# Patient Record
(demographics unavailable — no encounter records)

---

## 2025-01-16 NOTE — CONSULT LETTER
[Dear  ___] : Dear  [unfilled], [Courtesy Letter:] : I had the pleasure of seeing your patient, [unfilled], in my office today. [Please see my note below.] : Please see my note below. [Sincerely,] : Sincerely, [FreeTextEntry3] : Vernon Gomez MD

## 2025-01-16 NOTE — HISTORY OF PRESENT ILLNESS
[de-identified] : 73 yo female is referred by Dr. Mackey for consultation of adjuvant endocrine therapy for breast cancer. Emmanuelle had a screening mammo on 12/10/2020 which showed focal asymmetry in both breasts. On 12/21/2020, b/l dx mammo and US showed 1.4 cm irregular mass in the left breast 3:00 axis, 4 to 5 cm from the nipple. This corresponded to the focal asymmetry seen on mammogram. US guided biopsy was recommended. There was no left axillary adenopathy. No suspicious masses were seen in the right breast. \par  \par  On 12/31/2020, she had US guided biopsy of left breast 1.4 cm mass which revealed Invasive well differentiated classical type lobular carcinoma with microcalcifications and signet ring cell formation. ER was positive 100%, TX positive 85%, Ki 67 7-10%, Her-2 negative (FISH ratio 1.1). 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 Additional indeterminate 0.5 cm enhancing focus in the left breast lower inner quadrant, located approximately 2 cm inferior and medial to the index carcinoma. MRI guided biopsy is recommended. There is no axillary adenopathy. No MR evidence of malignancy of the right breast.\par  \par  On 2/4/21, MRI guided needle core biopsies of left lower inner quadrant enhancing focus showed single small duct papilloma fragment measuring 1.0 mm in this\par  biopsy material and benign atrophic fatty breast tissue with proliferative type fibrocystic changes.\par  \par  On 2/16/21, she underwent left breast lumpectomy. The radio frequency seed localized lumpectomy of left lateral mass revealed 1.9 cm invasive well differentiated classical type lobular carcinoma and wide spread classical type lobular carcinoma in-situ (LCIS). No lymphovascular or perineural invasion is seen. The margins were negative. SLNB was not done. The radio frequency seed localized lumpectomy of left lower inner quadrant medial mass showed Benign atrophic fatty breast tissue without histopathologic abnormality.\par  Healing prior biopsy site with organizing hematoma formation with no residual intraductal papillary lesion.\par  \par  She recovered well from surgery. She saw Dr. Plaza and discussed adjuvant breast radiotherapy. She opted to omit radiation. She is here today with her daughter to discuss adjuvant endocrine therapy. \par  \par   [de-identified] : 7/12/21: The patient is here today for f/u visit. She has well differentiated cILC of the left breast, ER/CO positive, Her-2 negative, s/p left breast lumpectomy with negative margins on 2/16/21. SLNB was not done. The pathologic stage is pT1cNx. She saw Dr. Plaza and decided to omit adjuvant breast radiation. She has been taking adjuvant endocrine therapy with Letrozole and tolerated well. She had bone density in 5/2021 which showed osteopenia. She has been taking calcium and vitamin D supplement. She is feeling well and does not have new complains.  1/24/22: The patient is here today for f/u visit. She has well differentiated cILC of the left breast, ER/CO positive, Her-2 negative, s/p left breast lumpectomy with negative margins on 2/16/21. SLNB was not done. The pathologic stage is pT1cNx. She saw Dr. Plaza and decided to omit adjuvant breast radiation. She has been taking adjuvant endocrine therapy with Letrozole and tolerated well. She had bone density in 5/2021 which showed osteopenia. She has been taking calcium and vitamin D supplement. Left breast dx mammo and US in 8/20201 did not show suspicious finding. She is feeling well and does not have new complains.  7/25/22: Emmanuelle  is here today for f/u visit. She has well differentiated cILC of the left breast, ER/CO positive, Her-2 negative, s/p left breast lumpectomy with negative margins on 2/16/21. SLNB was not done. The pathologic stage is pT1cNx. She saw Dr. Plaza and decided to omit adjuvant breast radiation. She has been taking adjuvant endocrine therapy with Letrozole and tolerated well. She had bone density in 5/2021 which showed osteopenia. She has been taking calcium and vitamin D supplement. b/l breast dx mammo on 2/21/22 did not show suspicious finding. She has chronic back pain and does not have new complains.  1/30/23: Emmanuelle  is here today for f/u visit. She has well differentiated cILC of the left breast, ER/CO positive, Her-2 negative, s/p left breast lumpectomy with negative margins on 2/16/21. SLNB was not done. The pathologic stage is pT1cNx. She saw Dr. Plaza and decided to omit adjuvant breast radiation. She has been taking adjuvant endocrine therapy with Letrozole and tolerated well. She had bone density in 5/2021 which showed osteopenia. She has been taking calcium and vitamin D supplement. b/l breast dx mammo on 2/21/22 and left breast dx mammo in 8/2022 did not show suspicious finding. She has chronic arthralgia.  7/31/23 Emmanuelle  is here today for f/u visit. She has well differentiated cILC of the left breast, ER/CO positive, Her-2 negative, s/p left breast lumpectomy with negative margins on 2/16/21. SLNB was not done. The pathologic stage is pT1cNx. She saw Dr. Plaza and decided to omit adjuvant breast radiation. She has been taking adjuvant endocrine therapy with Letrozole and tolerated well. She had repeat  bone density in 2/23 which showed osteopenia. She has been taking calcium and vitamin D supplement. left breast usg and b/l mammo in 2.23 did not show suspicious finding. Recs follow up u/l left mammogram recs in 6 months.She has chronic arthralgia.  1/29/24: Emmanuelle  is here today for f/u visit. She has well differentiated cILC of the left breast, ER/CO positive, Her-2 negative, s/p left breast lumpectomy with negative margins on 2/16/21. SLNB was not done. The pathologic stage is pT1cNx. She saw Dr. Plaza and decided to omit adjuvant breast radiation. She has been taking adjuvant endocrine therapy with Letrozole and tolerated well. She had repeat  bone density in 2/2023 which showed osteopenia. She has been taking calcium and vitamin D supplement. left breast usg and b/l mammo in 2.23 did not show suspicious finding. Recs follow up u/l left mammogram recs in 6 months. She has some chronic joint pain but no other new complains.  8.26.24: Emmanuelle  is here today for f/u visit. She has well differentiated cILC of the left breast, ER/CO positive, Her-2 negative, s/p left breast lumpectomy with negative margins on 2/16/21. SLNB was not done. The pathologic stage is pT1cNx. She saw Dr. Plaza and decided to omit adjuvant breast radiation. She has been taking adjuvant endocrine therapy with Letrozole and tolerating well. She had repeat bone density in 2/2023 which showed osteopenia. She has been taking calcium and vitamin D supplement. B/l mammo in 2.2024 did not show suspicious finding.  She has some chronic joint pain and hair loss but no other new complains  1/16/25: Emmanuelle is here today for f/u visit. She has well differentiated cILC of the left breast, ER/CO positive, Her-2 negative, s/p left breast lumpectomy with negative margins on 2/16/21. SLNB was not done. The pathologic stage is pT1cNx. She saw Dr. Plaza and decided to omit adjuvant breast radiation. She has been taking adjuvant endocrine therapy with Letrozole and tolerating well. B/l mammo in 2.2024 did not show suspicious finding.  She has some chronic joint pain and hair loss but no other new complains

## 2025-01-16 NOTE — ASSESSMENT
[FreeTextEntry1] : 74 yo female has well differentiated cILC of the left breast, ER/KS positive, Her-2 negative, s/p left breast lumpectomy with negative margins. SLNB was not done. The pathologic stage is pT1cNx.  Assessment and Plan: -- Continue Letrozole daily. Refilled today  -- Breast exam today is unrevealing.  b/l screening mammo in 2.24 did not show suspicious finding. Mammogram script given, due 2.2025 -- Osteopenia. Continue continue calcium and vitamin D supplement and regular exercise. We discussed bone health management. She enrolled in the survey study on bone health management in women with hormone receptor positive breast cancer on adjuvant AI and has osteopenia/osteoporosis. Last dexa scan 2.2023. Due for repeat in 2.2025, script given today -- Follow up with PCP for health maintenance.  -- RTO for followup in 6 months.

## 2025-01-16 NOTE — PHYSICAL EXAM
[Fully active, able to carry on all pre-disease performance without restriction] : Status 0 - Fully active, able to carry on all pre-disease performance without restriction [Normal] : affect appropriate [de-identified] : Status post left breast lumpectomy. Surgical incisions are healing well. There is no palpable abnormality.

## 2025-03-03 NOTE — PHYSICAL EXAM
[Normocephalic] : normocephalic [Atraumatic] : atraumatic [EOMI] : extra ocular movement intact [Examined in the supine and seated position] : examined in the supine and seated position [Symmetrical] : symmetrical [No dominant masses] : no dominant masses in right breast  [No dominant masses] : no dominant masses left breast [No Nipple Retraction] : no left nipple retraction [No Nipple Discharge] : no left nipple discharge [No Axillary Lymphadenopathy] : no left axillary lymphadenopathy [No Edema] : no edema [No Rashes] : no rashes [No Ulceration] : no ulceration [de-identified] : On physical exam, there are no discrete masses in either breast or axilla. There is no nipple discharge or inversion bilaterally. There are no skin changes bilaterally.

## 2025-03-03 NOTE — DATA REVIEWED
[FreeTextEntry1] : C: [id number]     EXAM:  MG MAMMO SCREEN W DANI BI#  02/24/2025 INTERPRETATION:  HISTORY: Bilateral MG MAMMO SCREEN W DANI BI# was performed. Patient is 76 years old and is seen for screening.  The patient has a history of left lumpectomy in February, 2021 - malignant, left excision in February, 2021 - high risk, left MRI guided biopsy in February, 2021 - high risk and left ultrasound guided biopsy in January, 2021 - LCIS. The patient has no family history of breast cancer.  RISK ASSESSMENT: Tyrer-Cuzick Lifetime Risk: 23.5  CLINICAL BREAST EXAM: The patient reports their last clinical breast exam was performed within the past year.  COMPARISON STUDIES: The present examination has been compared to prior imaging studies performed at [facility] on 02/20/2023, 08/21/2023 and 02/22/2024.  MAMMOGRAM FINDINGS: Mammography was performed including the following views: bilateral craniocaudal with tomosynthesis, bilateral mediolateral oblique with tomosynthesis.  The examination includes digital synthetic 2D and digital tomosynthesis 3D images. Additional imaging analysis was performed using CAD (computer-aided detection) software.  There are scattered areas of fibroglandular density.  There is an area of architectural distortion at the site of lumpectomy seen in the left breast.  No suspicious mass, grouping of calcifications, or other abnormality is identified.  IMPRESSION: There is no mammographic evidence of malignancy.  RECOMMENDATION: Unless otherwise indicated by clinical findings, annual screening mammography recommended.  ASSESSMENT: BI-RADS Category 2:  Benign

## 2025-03-03 NOTE — ASSESSMENT
[FreeTextEntry1] : Emmanuelle is a 76 F who presents with a screen detected L breast cancer, ILC, [id number], ER/IL pos, Her 2 neg, stage 1 breast cancer, s/p L WLE of two areas on 2/16/2021.   Her imaging is as follows: 2/24/2025 b/l mammo-->birads2 scattered areas of fibroglandular density. area of architectural distortion at the site of lumpectomy seen in the left breast.   2/16/2021 -- L WLE of two areas  1. L lateral breast WLE  -ILC, well differentiated  -T=1.9 cm  -no LVI  -additional margins: lateral and inferior margins with ALH, medial margin with cLCIS, superior and posterior margins were benign breast tissue  -[id number], ER/IL pos, her 2 neg   2. L LIQ mass  -no residual intraductal papilloma  -benign breast tissue   On physical exam, there are no discrete masses in either breast or axilla. There is no nipple discharge or inversion bilaterally. There are no skin changes bilaterally. Left breast surgical scar well healed   All of her questions were answered.  She knows to call with any further questions or concerns.  PLAN -b/l  Mammogram 2/25/26 -f/up after imaging  -continue follow up with med/ onc   Total time spent on encounter was greater than 20 minutes, which included face to face time with the patient, performing an exam, reviewing previous medical records including imaging/ pathology, documenting in patient record and coordinating care/imaging. Greater than 50% of the encounter was spent on counseling and coordination of her breast issue.

## 2025-03-03 NOTE — HISTORY OF PRESENT ILLNESS
[FreeTextEntry1] : Emmanuelle is a 72 F who presents with a screen detected L breast cancer, ILC, [id number], ER/NH pos, Her 2 neg, stage 1 breast cancer, s/p L WLE of two areas on 2021.   2021 -- L WLE of two areas  1. L lateral breast WLE  -ILC, well differentiated  -T=1.9 cm  -no LVI  -additional margins: lateral and inferior margins with ALH, medial margin with cLCIS, superior and posterior margins were benign breast tissue  -[id number], ER/NH pos, her 2 neg   2. L LIQ mass  -no residual intraductal papilloma  -benign breast tissue   Her work up was as follows:  12/10/2020 -- b/l screening mammogram  -scattered areas of fibroglandular densities -L: focal asymmetry in L, with associated with architectural distortion  -R: asymmetry  BIRADS 0   2020 -- b/l dx mammogram and US  -scattered areas of fibroglandular densities -L: lateral breast, focal asymmetry with architectural distortion c/w US findings  -R: asymmetry effaces on spot compression  -no suspicious mass, microcalcifications or architectural distortion in R  b/l US  -L: @3N4-5, irregular mass, measures 1 x 1.4 x 0.7 cm --> BIOPSY  -R: no suspicious solid or cystic masses  BIRADS 4   2020 -- L US CNBx @3N4-5  -ILC, well differentiated with microcalcifications  -cLCIS  -ER/NH pos, Her 2 neg (luis angel ) -Ki67: 7-10%  2021 -- breast MRI  -RIGHT: no suspicious enhancement  -LEFT: in lateral/central breast, 1.4 x 1.5 x 1.6 cm enhancing mass which is her biopsy proven cancer, additional 0.5 cm enhancing mass, located 0.8 cm medial to her cancer, likely a satellite lesion; 0.5 cm enhancing mass in the LIQ, anterior depth --> BIOPSY  BIRADS 4   2021 -- L MR guided biopsy  -small duct papilloma   She has no breast related complaints at this time.  She denies any breast pain, has not palpated any new palpable masses in either breast and denies any nipple discharge or retraction.  She did develop ecchymoses and bruising after her recent biopsy, however.   HISTORICAL RISK FACTORS:  -no prior breast biopsies or surgeries  -no family history of breast or ovarian cancer  -, age at first live birth was 21  -prior OCP use x 2 years, stopped 40 years prior -s/p unilateral oophorectomy in   INTERVAL HISTORY:  Emmanuelle is a 72 F who returns for her FIRST POST OP VISIT, s/p L WLE of two areas for ILC and intraductal papilloma on 2021.   Her final pathology revealed a 1.9 cm ILC, with negative surgical margins, no LVI, additional ALH and cLCIS, and her LIQ intraductal papilloma did not reveal any other areas of disease and no residual papilloma was noted on final pathology.  Her cancer was ER/NH pos, Her 2 neg.   She is healing well from her recent surgery.  She denies any pain, redness, fevers or chills.    10/28/2021 -- Emmanuelle is a 73 F who returns for her SIX MONTHS VISIT, s/p L WLE of two areas for ILC and intraductal papilloma on 2021.  [id number] She saw Dr. Plaza and decided to omit adjuvant breast radiation. She has been taking adjuvant endocrine therapy with Anastrozole and tolerated well  Her most recent imaging is as follows: 2021 - Left dx mammo and US -breasts are heterogeneously dense -architectural distortion corresponding to the site of lumpectomy in the left lateral breast. LEFT US: -@3:00 N5 at the lumpectomy site, there is a benign seroma measuring 0.7 x 0.6 x 0.4 cm. Deemed BIRADS2   INTERVAL HISTORY 3/21/22 Emmanuelle is a 73 F who returns for her SIX MONTHS VISIT. She has no breast related complaints at this time.  She denies any breast pain, has not palpated any new palpable masses in either breast and denies any nipple discharge or retraction.  Her imaging is as follows: 2022 b/l dx mammo -scattered areas of fibroglandular density. -status post a left lumpectomy with stable architectural distortion most consistent with postsurgical change. BI-RADS 2  INTERVAL HISTORY 22 Emmanuelle is a 74 F who returns for her SIX MONTHS VISIT. She has no breast related complaints at this time.  She denies any breast pain, has not palpated any new palpable masses in either breast and denies any nipple discharge or retraction.  Her imaging is as follows: 08/15/2022 left dxmammo  -scattered areas of fibroglandular density. -patient is status post a left lumpectomy with stable architectural distortion most consistent with postsurgical change BI-RADS 2   INTERVAL HISTORY 23 Emmanuelle is a 74 F who returns for her SIX MONTHS VISIT. She has no breast related complaints at this time.  She denies any breast pain, has not palpated any new palpable masses in either breast and denies any nipple discharge or retraction.  She has been taking adjuvant endocrine therapy with Letrozole and tolerated well Her imaging is as follows: 2023 b/l dx mammo and LEFT US -scattered areas of fibroglandular density. -Stable postsurgical changes noted in left breast LEFT US: -Postsurgical changes are noted at 3:00 of the left breast lumpectomy site.  BIRADS2  INTERVAL HISTORY 23 Emmanuelle is a 75 F who returns for her SIX MONTHS VISIT. She has no breast related complaints at this time.  She denies any breast pain, has not palpated any new palpable masses in either breast and denies any nipple discharge or retraction.  She has been taking adjuvant endocrine therapy with Letrozole and tolerated well  Her imaging is as follows: 2023 left dx mammo  -scattered areas of fibroglandular density. -Stable postsurgical changes noted in left breast BIRADS2   INTERVAL HISTORY 24 Emmanuelle is a 75 F who returns for her SIX MONTHS VISIT. She has no breast related complaints at this time.  She denies any breast pain, has not palpated any new palpable masses in either breast and denies any nipple discharge or retraction.  She has been taking adjuvant endocrine therapy with Letrozole and tolerated well  Her imaging is as follows: 2024 b/l mammo-->birads2 scattered areas of fibroglandular density. area of architectural distortion at the site of lumpectomy seen in the left breast   INTERVAL HISTORY 3/3/25 Emmanuelle is a 76 F who returns for her follow up visit  She has no breast related complaints at this time.  She denies any breast pain, has not palpated any new palpable masses in either breast and denies any nipple discharge or retraction.  She has been taking adjuvant endocrine therapy with Letrozole and tolerated well  Her imaging is as follows: 2025 b/l mammo-->birads2 scattered areas of fibroglandular density. area of architectural distortion at the site of lumpectomy seen in the left breast.

## 2025-03-03 NOTE — PHYSICAL EXAM
[Normocephalic] : normocephalic [Atraumatic] : atraumatic [EOMI] : extra ocular movement intact [Examined in the supine and seated position] : examined in the supine and seated position [Symmetrical] : symmetrical [No dominant masses] : no dominant masses in right breast  [No dominant masses] : no dominant masses left breast [No Nipple Retraction] : no left nipple retraction [No Nipple Discharge] : no left nipple discharge [No Axillary Lymphadenopathy] : no left axillary lymphadenopathy [No Edema] : no edema [No Rashes] : no rashes [No Ulceration] : no ulceration [de-identified] : On physical exam, there are no discrete masses in either breast or axilla. There is no nipple discharge or inversion bilaterally. There are no skin changes bilaterally.

## 2025-03-03 NOTE — ASSESSMENT
[FreeTextEntry1] : Emmanuelle is a 76 F who presents with a screen detected L breast cancer, ILC, [id number], ER/MO pos, Her 2 neg, stage 1 breast cancer, s/p L WLE of two areas on 2/16/2021.   Her imaging is as follows: 2/24/2025 b/l mammo-->birads2 scattered areas of fibroglandular density. area of architectural distortion at the site of lumpectomy seen in the left breast.   2/16/2021 -- L WLE of two areas  1. L lateral breast WLE  -ILC, well differentiated  -T=1.9 cm  -no LVI  -additional margins: lateral and inferior margins with ALH, medial margin with cLCIS, superior and posterior margins were benign breast tissue  -[id number], ER/MO pos, her 2 neg   2. L LIQ mass  -no residual intraductal papilloma  -benign breast tissue   On physical exam, there are no discrete masses in either breast or axilla. There is no nipple discharge or inversion bilaterally. There are no skin changes bilaterally. Left breast surgical scar well healed   All of her questions were answered.  She knows to call with any further questions or concerns.  PLAN -b/l  Mammogram 2/25/26 -f/up after imaging  -continue follow up with med/ onc   Total time spent on encounter was greater than 20 minutes, which included face to face time with the patient, performing an exam, reviewing previous medical records including imaging/ pathology, documenting in patient record and coordinating care/imaging. Greater than 50% of the encounter was spent on counseling and coordination of her breast issue.

## 2025-03-03 NOTE — HISTORY OF PRESENT ILLNESS
[FreeTextEntry1] : Emmanuelle is a 72 F who presents with a screen detected L breast cancer, ILC, [id number], ER/UT pos, Her 2 neg, stage 1 breast cancer, s/p L WLE of two areas on 2021.   2021 -- L WLE of two areas  1. L lateral breast WLE  -ILC, well differentiated  -T=1.9 cm  -no LVI  -additional margins: lateral and inferior margins with ALH, medial margin with cLCIS, superior and posterior margins were benign breast tissue  -[id number], ER/UT pos, her 2 neg   2. L LIQ mass  -no residual intraductal papilloma  -benign breast tissue   Her work up was as follows:  12/10/2020 -- b/l screening mammogram  -scattered areas of fibroglandular densities -L: focal asymmetry in L, with associated with architectural distortion  -R: asymmetry  BIRADS 0   2020 -- b/l dx mammogram and US  -scattered areas of fibroglandular densities -L: lateral breast, focal asymmetry with architectural distortion c/w US findings  -R: asymmetry effaces on spot compression  -no suspicious mass, microcalcifications or architectural distortion in R  b/l US  -L: @3N4-5, irregular mass, measures 1 x 1.4 x 0.7 cm --> BIOPSY  -R: no suspicious solid or cystic masses  BIRADS 4   2020 -- L US CNBx @3N4-5  -ILC, well differentiated with microcalcifications  -cLCIS  -ER/UT pos, Her 2 neg (luis angel ) -Ki67: 7-10%  2021 -- breast MRI  -RIGHT: no suspicious enhancement  -LEFT: in lateral/central breast, 1.4 x 1.5 x 1.6 cm enhancing mass which is her biopsy proven cancer, additional 0.5 cm enhancing mass, located 0.8 cm medial to her cancer, likely a satellite lesion; 0.5 cm enhancing mass in the LIQ, anterior depth --> BIOPSY  BIRADS 4   2021 -- L MR guided biopsy  -small duct papilloma   She has no breast related complaints at this time.  She denies any breast pain, has not palpated any new palpable masses in either breast and denies any nipple discharge or retraction.  She did develop ecchymoses and bruising after her recent biopsy, however.   HISTORICAL RISK FACTORS:  -no prior breast biopsies or surgeries  -no family history of breast or ovarian cancer  -, age at first live birth was 21  -prior OCP use x 2 years, stopped 40 years prior -s/p unilateral oophorectomy in   INTERVAL HISTORY:  Emmanuelle is a 72 F who returns for her FIRST POST OP VISIT, s/p L WLE of two areas for ILC and intraductal papilloma on 2021.   Her final pathology revealed a 1.9 cm ILC, with negative surgical margins, no LVI, additional ALH and cLCIS, and her LIQ intraductal papilloma did not reveal any other areas of disease and no residual papilloma was noted on final pathology.  Her cancer was ER/UT pos, Her 2 neg.   She is healing well from her recent surgery.  She denies any pain, redness, fevers or chills.    10/28/2021 -- Emmanuelle is a 73 F who returns for her SIX MONTHS VISIT, s/p L WLE of two areas for ILC and intraductal papilloma on 2021.  [id number] She saw Dr. Plaza and decided to omit adjuvant breast radiation. She has been taking adjuvant endocrine therapy with Anastrozole and tolerated well  Her most recent imaging is as follows: 2021 - Left dx mammo and US -breasts are heterogeneously dense -architectural distortion corresponding to the site of lumpectomy in the left lateral breast. LEFT US: -@3:00 N5 at the lumpectomy site, there is a benign seroma measuring 0.7 x 0.6 x 0.4 cm. Deemed BIRADS2   INTERVAL HISTORY 3/21/22 Emmanuelle is a 73 F who returns for her SIX MONTHS VISIT. She has no breast related complaints at this time.  She denies any breast pain, has not palpated any new palpable masses in either breast and denies any nipple discharge or retraction.  Her imaging is as follows: 2022 b/l dx mammo -scattered areas of fibroglandular density. -status post a left lumpectomy with stable architectural distortion most consistent with postsurgical change. BI-RADS 2  INTERVAL HISTORY 22 Emmanuelle is a 74 F who returns for her SIX MONTHS VISIT. She has no breast related complaints at this time.  She denies any breast pain, has not palpated any new palpable masses in either breast and denies any nipple discharge or retraction.  Her imaging is as follows: 08/15/2022 left dxmammo  -scattered areas of fibroglandular density. -patient is status post a left lumpectomy with stable architectural distortion most consistent with postsurgical change BI-RADS 2   INTERVAL HISTORY 23 Emmanuelle is a 74 F who returns for her SIX MONTHS VISIT. She has no breast related complaints at this time.  She denies any breast pain, has not palpated any new palpable masses in either breast and denies any nipple discharge or retraction.  She has been taking adjuvant endocrine therapy with Letrozole and tolerated well Her imaging is as follows: 2023 b/l dx mammo and LEFT US -scattered areas of fibroglandular density. -Stable postsurgical changes noted in left breast LEFT US: -Postsurgical changes are noted at 3:00 of the left breast lumpectomy site.  BIRADS2  INTERVAL HISTORY 23 Emmanuelle is a 75 F who returns for her SIX MONTHS VISIT. She has no breast related complaints at this time.  She denies any breast pain, has not palpated any new palpable masses in either breast and denies any nipple discharge or retraction.  She has been taking adjuvant endocrine therapy with Letrozole and tolerated well  Her imaging is as follows: 2023 left dx mammo  -scattered areas of fibroglandular density. -Stable postsurgical changes noted in left breast BIRADS2   INTERVAL HISTORY 24 Emmanuelle is a 75 F who returns for her SIX MONTHS VISIT. She has no breast related complaints at this time.  She denies any breast pain, has not palpated any new palpable masses in either breast and denies any nipple discharge or retraction.  She has been taking adjuvant endocrine therapy with Letrozole and tolerated well  Her imaging is as follows: 2024 b/l mammo-->birads2 scattered areas of fibroglandular density. area of architectural distortion at the site of lumpectomy seen in the left breast   INTERVAL HISTORY 3/3/25 Emmanuelle is a 76 F who returns for her follow up visit  She has no breast related complaints at this time.  She denies any breast pain, has not palpated any new palpable masses in either breast and denies any nipple discharge or retraction.  She has been taking adjuvant endocrine therapy with Letrozole and tolerated well  Her imaging is as follows: 2025 b/l mammo-->birads2 scattered areas of fibroglandular density. area of architectural distortion at the site of lumpectomy seen in the left breast.

## 2025-03-03 NOTE — DATA REVIEWED
[FreeTextEntry1] : C: [id number]     EXAM:  MG MAMMO SCREEN W DANI BI#  02/24/2025 INTERPRETATION:  HISTORY: Bilateral MG MAMMO SCREEN W DANI BI# was performed. Patient is 76 years old and is seen for screening.  The patient has a history of left lumpectomy in February, 2021 - malignant, left excision in February, 2021 - high risk, left MRI guided biopsy in February, 2021 - high risk and left ultrasound guided biopsy in January, 2021 - LCIS. The patient has no family history of breast cancer.  RISK ASSESSMENT: Tyrer-Cuzick Lifetime Risk: 23.5  CLINICAL BREAST EXAM: The patient reports their last clinical breast exam was performed within the past year.  COMPARISON STUDIES: The present examination has been compared to prior imaging studies performed at [facility] on 02/20/2023, 08/21/2023 and 02/22/2024.  MAMMOGRAM FINDINGS: Mammography was performed including the following views: bilateral craniocaudal with tomosynthesis, bilateral mediolateral oblique with tomosynthesis.  The examination includes digital synthetic 2D and digital tomosynthesis 3D images. Additional imaging analysis was performed using CAD (computer-aided detection) software.  There are scattered areas of fibroglandular density.  There is an area of architectural distortion at the site of lumpectomy seen in the left breast.  No suspicious mass, grouping of calcifications, or other abnormality is identified.  IMPRESSION: There is no mammographic evidence of malignancy.  RECOMMENDATION: Unless otherwise indicated by clinical findings, annual screening mammography recommended.  ASSESSMENT: BI-RADS Category 2:  Benign

## 2025-03-19 NOTE — PROCEDURE
[FreeTextEntry3] :  Date of Service: 03/19/2025   Account: [id number]  Patient: CLINT CRAWFORD   YOB: 1948  Age: 76 year  Surgeon:      Georges Waggoner DO  Pre-Operative Diagnosis:         Right Primary Osteoarthritis of Hip (M16.0)  Post-Operative Diagnosis:       Same  Procedure:             Right Hip arthrogram and steroid injection under fluoroscopic guidance.    This procedure was carried out using fluoroscopic guidance.  The risks and benefits of the procedure were discussed extensively with the patient.  The consent of the patient was obtained and the following procedure was performed. The patient was placed in the supine position on the fluoroscopic table and the area was prepped and draped in a sterile fashion.  A timeout was performed with all essential staff present and the site and side were verified.  The patient was placed in the supine position with right hip flexed and externally rotated 25 degrees.  The area of the right groin was prepped and draped in a sterile fashion.  The fluoroscopic image intensifier was then positioned so that the right hip appeared in view, and the midline intertrochanteric region was identified and marked.  A 25 gauge spinal needle was then inserted and directed into this right hip intra-capsular region.  After negative aspiration for heme and CSF, 3 cc of Omnipaque was injected and appeared to fill the joint margins.  Right hip arthrogram showed no intravascular flow, and good spread around the femoral head and to the acetabulum. An injectate of 4 cc 0.25% Marcaine plus 10 mg of Dexamethasone was then injected into the right hip space.   The needle was subsequently removed.  Vital signs remained normal.  Pulse oximeter was used throughout the procedure and the patient's pulse and oxygen saturation remained within normal limits.  The patient tolerated the procedure well.  There were no complications.  The patient was instructed to apply ice over the injection sites for twenty minutes every two hours for the next 24 to 48 hours.  Disposition:       1. The patient was advised to F/U in 1-2 weeks to assess the response to the injection.      2. The patient was also instructed to contact me immediately if there were any concerns related to the procedure performed.    Georges Waggoner DO

## 2025-04-21 NOTE — PHYSICAL EXAM
[de-identified] : Right Hip/Thigh: minimal tenderness. ROM minimally restricted.  impingement + r side

## 2025-04-21 NOTE — ASSESSMENT
[FreeTextEntry1] : 74-year-old female presenting with much improved right hip pain secondary to right hip injection.  I advised to continue with conservative care.  She will follow-up in 6 months for reassessment.\par  \par  Entered by Loida Ochoa, acting as scribe for Dr. Crum.\par   \par  The documentation recorded by the scribe, in my presence, accurately reflects the service I personally performed, and the decisions made by me with my edits as appropriate.\par   \par  Best Regards, \par  Manuel Crum MD \par  Board Certified, Anesthesiology \par  Board Certified, Pain Medicine\par  \par

## 2025-04-21 NOTE — DISCUSSION/SUMMARY
[de-identified] : A discussion regarding available pain management treatment options occurred with the patient. These included interventional, rehabilitative, pharmacological, and alternative modalities. We will proceed with the following:   Interventional/ Procedures: 1. None indicated at this time.   Imagin.  Ordered X-ray of the bilateral hips/ pelvis due to pain and decrease in range of motion in that area to delineate a pain generator   Rehabilitative/alternative modalities: 1. Initiate physician directed activity and lifestyle modifications. 2. Participation in active HEP was discussed and printed. 3. Patient was advised to stay away from any heavy lifting. If needed, they were advised to squat and not bend forward. 4. PT Rx was given to the pt today for her low back.    F/u in 5-6 weeks   MAKENNA Wilkins DO

## 2025-04-21 NOTE — HISTORY OF PRESENT ILLNESS
[FreeTextEntry1] : HISTORY OF PRESENT ILLNESS: Ms. Raygoza is a 74 year old female complaining of hip pain. The pain started after being seen orthopedics and diagnosed with arthritis. The patient has had this pain for 1 year. Patient describes the pain as moderate to severe. During the last month the pain has been intermittent with symptoms worsening evening. Pain described as cramping. Pain is increased with standing, walking, sitting. Pain is decreased with sitting. Pain is not changed with coughing/sneezing and bowel movements. Bowel or bladder habits have not changed.  ACTIVITIES: Patient could walk 10 blocks before the pain starts. Patient can sit many hours before pain starts. Patient can stand many hours before pain starts. The patient never lies down because of pain. Patient uses no assistive walking device at this time. Patient has difficulty exercising at this time.  PRIOR PAIN TREATMENTS: No prior pain treatments noted.  Prior Pain Medications: Tylenol.  TODAY 04-: This is a previous patient of Dr. Tena transferring her care to me today. Last seen on 03/03/2025 and since then there has been no new complaints or acute changes. She underwent a right hip injection on 03/19/2025 which gave her 30% relief, to date. She states that most of her pain happens with no typical patten, she also gets random sharp shooting pain down her RT LE, she states that usually happens when she walks for a long period of time and occasionally she gets low back pain across.

## 2025-07-23 NOTE — ASSESSMENT
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 SLNB was not done. -- Continue Letrozole daily. Refilled today  -- Breast exam today is unrevealing.  b/l screening mammo in 2/2025 did not show suspicious finding. She will have repeat b/l screening mammo in 2/2026. -- Osteopenia. Continue calcium and vitamin D supplement and regular exercise. She enrolled in the survey study on bone health management in women with hormone receptor positive breast cancer on adjuvant AI and has osteopenia/osteoporosis.   # Iron deficiency anemia -- S/P flaring diverticulitis. Weight loss. -- S/P blood transfusion and iron infusion in 6/2024.  -- Will check CBC and iron study.  -- Will decide if iron infusion is needed.   RTO for followup in 3 months.

## 2025-07-23 NOTE — PHYSICAL EXAM
[Fully active, able to carry on all pre-disease performance without restriction] : Status 0 - Fully active, able to carry on all pre-disease performance without restriction [Normal] : affect appropriate [de-identified] : Status post left breast lumpectomy. Surgical incisions are healing well. There is no palpable abnormality.

## 2025-07-23 NOTE — PHYSICAL EXAM
[Fully active, able to carry on all pre-disease performance without restriction] : Status 0 - Fully active, able to carry on all pre-disease performance without restriction [Normal] : affect appropriate [de-identified] : Status post left breast lumpectomy. Surgical incisions are healing well. There is no palpable abnormality.

## 2025-07-23 NOTE — HISTORY OF PRESENT ILLNESS
[de-identified] : 71 yo female is referred by Dr. Mackye for consultation of adjuvant endocrine therapy for breast cancer. Emmanuelle had a screening mammo on 12/10/2020 which showed focal asymmetry in both breasts. On 12/21/2020, b/l dx mammo and US showed 1.4 cm irregular mass in the left breast 3:00 axis, 4 to 5 cm from the nipple. This corresponded to the focal asymmetry seen on mammogram. US guided biopsy was recommended. There was no left axillary adenopathy. No suspicious masses were seen in the right breast. \par  \par  On 12/31/2020, she had US guided biopsy of left breast 1.4 cm mass which revealed Invasive well differentiated classical type lobular carcinoma with microcalcifications and signet ring cell formation. ER was positive 100%, SC positive 85%, Ki 67 7-10%, Her-2 negative (FISH ratio 1.1). 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 Additional indeterminate 0.5 cm enhancing focus in the left breast lower inner quadrant, located approximately 2 cm inferior and medial to the index carcinoma. MRI guided biopsy is recommended. There is no axillary adenopathy. No MR evidence of malignancy of the right breast.\par  \par  On 2/4/21, MRI guided needle core biopsies of left lower inner quadrant enhancing focus showed single small duct papilloma fragment measuring 1.0 mm in this\par  biopsy material and benign atrophic fatty breast tissue with proliferative type fibrocystic changes.\par  \par  On 2/16/21, she underwent left breast lumpectomy. The radio frequency seed localized lumpectomy of left lateral mass revealed 1.9 cm invasive well differentiated classical type lobular carcinoma and wide spread classical type lobular carcinoma in-situ (LCIS). No lymphovascular or perineural invasion is seen. The margins were negative. SLNB was not done. The radio frequency seed localized lumpectomy of left lower inner quadrant medial mass showed Benign atrophic fatty breast tissue without histopathologic abnormality.\par  Healing prior biopsy site with organizing hematoma formation with no residual intraductal papillary lesion.\par  \par  She recovered well from surgery. She saw Dr. Plaza and discussed adjuvant breast radiotherapy. She opted to omit radiation. She is here today with her daughter to discuss adjuvant endocrine therapy. \par  \par   [de-identified] : 7/12/21: The patient is here today for f/u visit. She has well differentiated cILC of the left breast, ER/FL positive, Her-2 negative, s/p left breast lumpectomy with negative margins on 2/16/21. SLNB was not done. The pathologic stage is pT1cNx. She saw Dr. Plaza and decided to omit adjuvant breast radiation. She has been taking adjuvant endocrine therapy with Letrozole and tolerated well. She had bone density in 5/2021 which showed osteopenia. She has been taking calcium and vitamin D supplement. She is feeling well and does not have new complains.  1/24/22: The patient is here today for f/u visit. She has well differentiated cILC of the left breast, ER/FL positive, Her-2 negative, s/p left breast lumpectomy with negative margins on 2/16/21. SLNB was not done. The pathologic stage is pT1cNx. She saw Dr. Plaza and decided to omit adjuvant breast radiation. She has been taking adjuvant endocrine therapy with Letrozole and tolerated well. She had bone density in 5/2021 which showed osteopenia. She has been taking calcium and vitamin D supplement. Left breast dx mammo and US in 8/20201 did not show suspicious finding. She is feeling well and does not have new complains.  7/25/22: Emmanuelle  is here today for f/u visit. She has well differentiated cILC of the left breast, ER/FL positive, Her-2 negative, s/p left breast lumpectomy with negative margins on 2/16/21. SLNB was not done. The pathologic stage is pT1cNx. She saw Dr. Plaza and decided to omit adjuvant breast radiation. She has been taking adjuvant endocrine therapy with Letrozole and tolerated well. She had bone density in 5/2021 which showed osteopenia. She has been taking calcium and vitamin D supplement. b/l breast dx mammo on 2/21/22 did not show suspicious finding. She has chronic back pain and does not have new complains.  1/30/23: Emmanuelle  is here today for f/u visit. She has well differentiated cILC of the left breast, ER/FL positive, Her-2 negative, s/p left breast lumpectomy with negative margins on 2/16/21. SLNB was not done. The pathologic stage is pT1cNx. She saw Dr. Plaza and decided to omit adjuvant breast radiation. She has been taking adjuvant endocrine therapy with Letrozole and tolerated well. She had bone density in 5/2021 which showed osteopenia. She has been taking calcium and vitamin D supplement. b/l breast dx mammo on 2/21/22 and left breast dx mammo in 8/2022 did not show suspicious finding. She has chronic arthralgia.  7/31/23 Emmanuelle  is here today for f/u visit. She has well differentiated cILC of the left breast, ER/FL positive, Her-2 negative, s/p left breast lumpectomy with negative margins on 2/16/21. SLNB was not done. The pathologic stage is pT1cNx. She saw Dr. Plaza and decided to omit adjuvant breast radiation. She has been taking adjuvant endocrine therapy with Letrozole and tolerated well. She had repeat  bone density in 2/23 which showed osteopenia. She has been taking calcium and vitamin D supplement. left breast usg and b/l mammo in 2.23 did not show suspicious finding. Recs follow up u/l left mammogram recs in 6 months.She has chronic arthralgia.  1/29/24: Emmanuelle  is here today for f/u visit. She has well differentiated cILC of the left breast, ER/FL positive, Her-2 negative, s/p left breast lumpectomy with negative margins on 2/16/21. SLNB was not done. The pathologic stage is pT1cNx. She saw Dr. Plaza and decided to omit adjuvant breast radiation. She has been taking adjuvant endocrine therapy with Letrozole and tolerated well. She had repeat  bone density in 2/2023 which showed osteopenia. She has been taking calcium and vitamin D supplement. left breast usg and b/l mammo in 2.23 did not show suspicious finding. Recs follow up u/l left mammogram recs in 6 months. She has some chronic joint pain but no other new complains.  8.26.24: Emmanuelle  is here today for f/u visit. She has well differentiated cILC of the left breast, ER/FL positive, Her-2 negative, s/p left breast lumpectomy with negative margins on 2/16/21. SLNB was not done. The pathologic stage is pT1cNx. She saw Dr. Plaza and decided to omit adjuvant breast radiation. She has been taking adjuvant endocrine therapy with Letrozole and tolerating well. She had repeat bone density in 2/2023 which showed osteopenia. She has been taking calcium and vitamin D supplement. B/l mammo in 2.2024 did not show suspicious finding.  She has some chronic joint pain and hair loss but no other new complains  1/16/25: Emmanuelle is here today for f/u visit. She has well differentiated cILC of the left breast, ER/FL positive, Her-2 negative, s/p left breast lumpectomy with negative margins on 2/16/21. SLNB was not done. The pathologic stage is pT1cNx. She saw Dr. Plaza and decided to omit adjuvant breast radiation. She has been taking adjuvant endocrine therapy with Letrozole and tolerating well. B/l mammo in 2.2024 did not show suspicious finding.  She has some chronic joint pain but no other new complains.   7/23/25 Emmanuelle is here today for f/u visit. She has well differentiated cILC of the left breast, ER/FL positive, Her-2 negative, s/p left breast lumpectomy with negative margins on 2/16/21. SLNB was not done. The pathologic stage is pT1cNx. She saw Dr. Plaza and decided to omit adjuvant breast radiation. She has been taking adjuvant endocrine therapy with Letrozole and tolerating well. B/l mammo in 2.2025 did not show suspicious finding.  She had diverticulitis with bowel issues. She lost > 30 pound and has been anemic. She was admitted to [facility] in 6/2024 and received blood transfusion and iron infusion.

## 2025-07-23 NOTE — HISTORY OF PRESENT ILLNESS
[de-identified] : 71 yo female is referred by Dr. Mackey for consultation of adjuvant endocrine therapy for breast cancer. Emmanuelle had a screening mammo on 12/10/2020 which showed focal asymmetry in both breasts. On 12/21/2020, b/l dx mammo and US showed 1.4 cm irregular mass in the left breast 3:00 axis, 4 to 5 cm from the nipple. This corresponded to the focal asymmetry seen on mammogram. US guided biopsy was recommended. There was no left axillary adenopathy. No suspicious masses were seen in the right breast. \par  \par  On 12/31/2020, she had US guided biopsy of left breast 1.4 cm mass which revealed Invasive well differentiated classical type lobular carcinoma with microcalcifications and signet ring cell formation. ER was positive 100%, NV positive 85%, Ki 67 7-10%, Her-2 negative (FISH ratio 1.1). 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 Additional indeterminate 0.5 cm enhancing focus in the left breast lower inner quadrant, located approximately 2 cm inferior and medial to the index carcinoma. MRI guided biopsy is recommended. There is no axillary adenopathy. No MR evidence of malignancy of the right breast.\par  \par  On 2/4/21, MRI guided needle core biopsies of left lower inner quadrant enhancing focus showed single small duct papilloma fragment measuring 1.0 mm in this\par  biopsy material and benign atrophic fatty breast tissue with proliferative type fibrocystic changes.\par  \par  On 2/16/21, she underwent left breast lumpectomy. The radio frequency seed localized lumpectomy of left lateral mass revealed 1.9 cm invasive well differentiated classical type lobular carcinoma and wide spread classical type lobular carcinoma in-situ (LCIS). No lymphovascular or perineural invasion is seen. The margins were negative. SLNB was not done. The radio frequency seed localized lumpectomy of left lower inner quadrant medial mass showed Benign atrophic fatty breast tissue without histopathologic abnormality.\par  Healing prior biopsy site with organizing hematoma formation with no residual intraductal papillary lesion.\par  \par  She recovered well from surgery. She saw Dr. Plaza and discussed adjuvant breast radiotherapy. She opted to omit radiation. She is here today with her daughter to discuss adjuvant endocrine therapy. \par  \par   [de-identified] : 7/12/21: The patient is here today for f/u visit. She has well differentiated cILC of the left breast, ER/TN positive, Her-2 negative, s/p left breast lumpectomy with negative margins on 2/16/21. SLNB was not done. The pathologic stage is pT1cNx. She saw Dr. Plaza and decided to omit adjuvant breast radiation. She has been taking adjuvant endocrine therapy with Letrozole and tolerated well. She had bone density in 5/2021 which showed osteopenia. She has been taking calcium and vitamin D supplement. She is feeling well and does not have new complains.  1/24/22: The patient is here today for f/u visit. She has well differentiated cILC of the left breast, ER/TN positive, Her-2 negative, s/p left breast lumpectomy with negative margins on 2/16/21. SLNB was not done. The pathologic stage is pT1cNx. She saw Dr. Plaza and decided to omit adjuvant breast radiation. She has been taking adjuvant endocrine therapy with Letrozole and tolerated well. She had bone density in 5/2021 which showed osteopenia. She has been taking calcium and vitamin D supplement. Left breast dx mammo and US in 8/20201 did not show suspicious finding. She is feeling well and does not have new complains.  7/25/22: Emmanuelle  is here today for f/u visit. She has well differentiated cILC of the left breast, ER/TN positive, Her-2 negative, s/p left breast lumpectomy with negative margins on 2/16/21. SLNB was not done. The pathologic stage is pT1cNx. She saw Dr. Plaza and decided to omit adjuvant breast radiation. She has been taking adjuvant endocrine therapy with Letrozole and tolerated well. She had bone density in 5/2021 which showed osteopenia. She has been taking calcium and vitamin D supplement. b/l breast dx mammo on 2/21/22 did not show suspicious finding. She has chronic back pain and does not have new complains.  1/30/23: Emmanuelle  is here today for f/u visit. She has well differentiated cILC of the left breast, ER/TN positive, Her-2 negative, s/p left breast lumpectomy with negative margins on 2/16/21. SLNB was not done. The pathologic stage is pT1cNx. She saw Dr. Plaza and decided to omit adjuvant breast radiation. She has been taking adjuvant endocrine therapy with Letrozole and tolerated well. She had bone density in 5/2021 which showed osteopenia. She has been taking calcium and vitamin D supplement. b/l breast dx mammo on 2/21/22 and left breast dx mammo in 8/2022 did not show suspicious finding. She has chronic arthralgia.  7/31/23 Emmanuelle  is here today for f/u visit. She has well differentiated cILC of the left breast, ER/TN positive, Her-2 negative, s/p left breast lumpectomy with negative margins on 2/16/21. SLNB was not done. The pathologic stage is pT1cNx. She saw Dr. Plaza and decided to omit adjuvant breast radiation. She has been taking adjuvant endocrine therapy with Letrozole and tolerated well. She had repeat  bone density in 2/23 which showed osteopenia. She has been taking calcium and vitamin D supplement. left breast usg and b/l mammo in 2.23 did not show suspicious finding. Recs follow up u/l left mammogram recs in 6 months.She has chronic arthralgia.  1/29/24: Emmanuelle  is here today for f/u visit. She has well differentiated cILC of the left breast, ER/TN positive, Her-2 negative, s/p left breast lumpectomy with negative margins on 2/16/21. SLNB was not done. The pathologic stage is pT1cNx. She saw Dr. Plaza and decided to omit adjuvant breast radiation. She has been taking adjuvant endocrine therapy with Letrozole and tolerated well. She had repeat  bone density in 2/2023 which showed osteopenia. She has been taking calcium and vitamin D supplement. left breast usg and b/l mammo in 2.23 did not show suspicious finding. Recs follow up u/l left mammogram recs in 6 months. She has some chronic joint pain but no other new complains.  8.26.24: Emmanuelle  is here today for f/u visit. She has well differentiated cILC of the left breast, ER/TN positive, Her-2 negative, s/p left breast lumpectomy with negative margins on 2/16/21. SLNB was not done. The pathologic stage is pT1cNx. She saw Dr. Plaza and decided to omit adjuvant breast radiation. She has been taking adjuvant endocrine therapy with Letrozole and tolerating well. She had repeat bone density in 2/2023 which showed osteopenia. She has been taking calcium and vitamin D supplement. B/l mammo in 2.2024 did not show suspicious finding.  She has some chronic joint pain and hair loss but no other new complains  1/16/25: Emmanuelle is here today for f/u visit. She has well differentiated cILC of the left breast, ER/TN positive, Her-2 negative, s/p left breast lumpectomy with negative margins on 2/16/21. SLNB was not done. The pathologic stage is pT1cNx. She saw Dr. Plaza and decided to omit adjuvant breast radiation. She has been taking adjuvant endocrine therapy with Letrozole and tolerating well. B/l mammo in 2.2024 did not show suspicious finding.  She has some chronic joint pain but no other new complains.   7/23/25 Emmanuelle is here today for f/u visit. She has well differentiated cILC of the left breast, ER/TN positive, Her-2 negative, s/p left breast lumpectomy with negative margins on 2/16/21. SLNB was not done. The pathologic stage is pT1cNx. She saw Dr. Plaza and decided to omit adjuvant breast radiation. She has been taking adjuvant endocrine therapy with Letrozole and tolerating well. B/l mammo in 2.2025 did not show suspicious finding.  She had diverticulitis with bowel issues. She lost > 30 pound and has been anemic. She was admitted to [facility] in 6/2024 and received blood transfusion and iron infusion.